# Patient Record
Sex: FEMALE | Race: WHITE
[De-identification: names, ages, dates, MRNs, and addresses within clinical notes are randomized per-mention and may not be internally consistent; named-entity substitution may affect disease eponyms.]

---

## 2018-04-27 ENCOUNTER — HOSPITAL ENCOUNTER (EMERGENCY)
Dept: HOSPITAL 41 - JD.ED | Age: 68
Discharge: HOME | End: 2018-04-27
Payer: COMMERCIAL

## 2018-04-27 DIAGNOSIS — S00.01XA: ICD-10-CM
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
## 2018-04-27 DIAGNOSIS — S06.0X0A: Primary | ICD-10-CM

## 2018-04-27 DIAGNOSIS — S00.03XA: ICD-10-CM

## 2018-04-27 PROCEDURE — 99283 EMERGENCY DEPT VISIT LOW MDM: CPT

## 2018-04-27 NOTE — EDM.PDOC
ED HPI GENERAL MEDICAL PROBLEM





- General


Chief Complaint: Head Injury


Stated Complaint: HEAD INJURY


Time Seen by Provider: 04/27/18 11:21


Source of Information: Reports: Patient


History Limitations: Reports: No Limitations





- History of Present Illness


INITIAL COMMENTS - FREE TEXT/NARRATIVE: 


Patient is here for evaluation of a head injury that occurred around 9:00 this 

morning.  


Upon arrival due to the mechanism of injury, minor was called at 11:20 AM 

patient seen at that time.





Patient states that she was in [place] dropping off her recycling and she leaned 

over the dumpster to make sure that it went where she was intending to throw it 

and the lid of the dumpster dropped from her extended hand and hit her on the 

posterior right side of the head.


Patient states that she did not use consciousness at all.  She states that she 

did have severe pain.  She was also initially a bit dizzy and disoriented but 

states that this passed fairly quickly.  She drove for [place] to [place] where 

she talked to the lady at [place] suggested she come to the emergency room.  Her 

sister-in-law then drove her for  [place] to [place].


Upon arrival patient is complaining of headache which is fairly localized to 

the right posterior head and some numbness and tingling in her upper 

extremities.  She denies any neck pain.  She states she feels a little bit 

dizzy and "off".


She has no chronic medical conditions.  She is not on any medications on a 

daily basis.  She does take some vitamins.





  ** Posterior Head


Pain Score (Numeric/FACES): 5





- Related Data


 Allergies











Allergy/AdvReac Type Severity Reaction Status Date / Time


 


No Known Allergies Allergy   Verified 04/27/18 11:28











Home Meds: 


 Home Meds





Multivitamin with Minerals [Multiple Vitamin] 1 tab PO DAILY 04/27/18 [History]


Ubidecarenone [Co Q-10] 1 tab PO DAILY 04/27/18 [History]


Vitamin E 1,000 mg PO DAILY 04/27/18 [History]











ED ROS GENERAL





- Review of Systems


Review Of Systems: See Below


Constitutional: Reports: No Symptoms


HEENT: Denies: Ear Discharge, Hearing Loss, Vertigo, Vision Change


Respiratory: Reports: No Symptoms


Cardiovascular: Reports: No Symptoms


GI/Abdominal: Reports: No Symptoms


Musculoskeletal: Denies: Neck Pain, Muscle Stiffness


Skin: Reports: Wound (Posterior head)


Neurological: Reports: Dizziness, Headache, Numbness, Tingling.  Denies: 

Confusion, Change in Speech


Psychiatric: Denies: Agitation, Anxiety, Confusion





ED EXAM, HEAD INJURY





- Physical Exam


Exam: See Below


Exam Limited By: No Limitations


General Appearance: Alert, WD/WN, No Apparent Distress


Head: Normocephalic, Scalp Abrasions, Scalp Hematoma


Eyes: Bilateral Eye: PERRL


Ears: Normal External Exam, Normal Canal, Normal TMs, Other (Decreased hearing,

wears bilateral hearing aids)


Nose: Normal Inspection, Normal Mucousa, No Blood


Throat/Mouth: Normal Inspection, Normal Oropharynx


Neck: Non-Tender, Full Range of Motion


Respiratory: No Respiratory Distress, Lungs Clear


Cardiovascular: Regular Rate, Rhythm, No Murmur


Back Exam: Normal Inspection.  No: Vertebral Tenderness


Extremities: Normal Inspection, Normal Range of Motion, Non-Tender, Normal 

Capillary Refill


Neurologic: No Motor/Sensory Deficits, Normal Mood/Affect, Oriented x 3


Skin: Normal Color, Warm/Dry, Other (Superficial abrasion to right posterior 

scalp, small measuring 0.5cm.  Surrounding hematoma 2.5cm diameter. )





- [place] Coma Score


Best Eye Response ([place]): (4) Open Spontaneously


Best Verbal Response ([place]): (5) Oriented


Best Motor Response ([place]): (6) Obeys Commands





Course





- Vital Signs


Last Recorded V/S: 


 Last Vital Signs











Temp  98.1 F   04/27/18 11:23


 


Pulse  69   04/27/18 11:23


 


Resp  20   04/27/18 11:23


 


BP  161/98 H  04/27/18 11:23


 


Pulse Ox  97   04/27/18 11:23














- Orders/Labs/Meds


Meds: 


Medications














Discontinued Medications














Generic Name Dose Route Start Last Admin





  Trade Name Petersonq  PRN Reason Stop Dose Admin


 


Acetaminophen  650 mg  04/27/18 11:28  04/27/18 11:36





  Tylenol  PO  04/27/18 11:29  650 mg





  NOW ONE   Administration





     





     





     





     














- Re-Assessments/Exams


Free Text/Narrative Re-Assessment/Exam: 


Patient is alert and oriented.  Initial neurologic exam is completely normal.  


Superficial abrasion was cleaned, no dressing or wound closure indicated.


Patient initially offered 650mg Tylenol, she declined and chose to only take 

325 mg.  Patient states that her headache completely resolved with this in 

approximately 30 minutes.


At that time second neurologic exam was performed and again completely normal.


Patient will be discharged home. her sister in law will be giving her a ride 

and be with her today.


I did advise patient that if she should have any changes in behavior or speech, 

worsening headache or other any other symptom she feels is abnormal but she 

said certainly return to the emergency room.


 She may have a mild headache today and tomorrow can take Tylenol for this.  

Advised her if  headache should persist and she will need to follow-up with her 

PCP as well.


04/27/18 12:26








Departure





- Departure


Time of Disposition: 12:29


Disposition: Home, Self-Care 01


Condition: Good


Clinical Impression: 


 Concussion with no loss of consciousness





Head injury


Qualifiers:


 Encounter type: initial encounter Qualified Code(s): S09.90XA - Unspecified 

injury of head, initial encounter





Scalp abrasion


Qualifiers:


 Encounter type: initial encounter Qualified Code(s): S00.01XA - Abrasion of 

scalp, initial encounter








- Discharge Information


Instructions:  Head Injury, Adult, Easy-to-Read


Referrals: 


German Lockhart MD [Primary Care Provider] - 


Forms:  ED Department Discharge


Additional Instructions: 


You were evaluated in the emergency room today for a head injury.  You have a 

mild concussion.


I recommend that you take it easy today.  


Take Tylenol 325-650 mg every 4 hours as needed for headache.


If headaches do not resolve over the next 48 hours and you certainly should 

follow-up with her primary provider.


If you should have any changes in behavior or vision or any worsening/severe 

headache or anything you feel is abnormal, certainly return to the emergency 

room.